# Patient Record
Sex: FEMALE | Race: WHITE | NOT HISPANIC OR LATINO
[De-identification: names, ages, dates, MRNs, and addresses within clinical notes are randomized per-mention and may not be internally consistent; named-entity substitution may affect disease eponyms.]

---

## 2017-05-30 ENCOUNTER — APPOINTMENT (OUTPATIENT)
Dept: ORTHOPEDIC SURGERY | Facility: CLINIC | Age: 67
End: 2017-05-30

## 2017-05-30 VITALS — HEART RATE: 64 BPM | DIASTOLIC BLOOD PRESSURE: 75 MMHG | SYSTOLIC BLOOD PRESSURE: 148 MMHG

## 2017-05-30 RX ORDER — UBIDECARENONE/VIT E ACET 100MG-5
CAPSULE ORAL
Refills: 0 | Status: ACTIVE | COMMUNITY

## 2017-09-20 ENCOUNTER — APPOINTMENT (OUTPATIENT)
Dept: OPHTHALMOLOGY | Facility: CLINIC | Age: 67
End: 2017-09-20
Payer: MEDICARE

## 2017-09-20 PROCEDURE — 92014 COMPRE OPH EXAM EST PT 1/>: CPT

## 2017-09-26 ENCOUNTER — APPOINTMENT (OUTPATIENT)
Dept: ORTHOPEDIC SURGERY | Facility: CLINIC | Age: 67
End: 2017-09-26
Payer: MEDICARE

## 2017-09-26 VITALS
HEIGHT: 63 IN | WEIGHT: 117 LBS | SYSTOLIC BLOOD PRESSURE: 146 MMHG | HEART RATE: 64 BPM | BODY MASS INDEX: 20.73 KG/M2 | DIASTOLIC BLOOD PRESSURE: 77 MMHG

## 2017-09-26 DIAGNOSIS — M75.82 OTHER SHOULDER LESIONS, LEFT SHOULDER: ICD-10-CM

## 2017-09-26 PROCEDURE — 99213 OFFICE O/P EST LOW 20 MIN: CPT

## 2017-11-21 ENCOUNTER — RESULT REVIEW (OUTPATIENT)
Age: 67
End: 2017-11-21

## 2018-07-16 ENCOUNTER — APPOINTMENT (OUTPATIENT)
Dept: OBGYN | Facility: CLINIC | Age: 68
End: 2018-07-16
Payer: MEDICARE

## 2018-07-16 VITALS
WEIGHT: 117 LBS | HEIGHT: 63 IN | DIASTOLIC BLOOD PRESSURE: 60 MMHG | BODY MASS INDEX: 20.73 KG/M2 | SYSTOLIC BLOOD PRESSURE: 128 MMHG

## 2018-07-16 DIAGNOSIS — Z12.11 ENCOUNTER FOR SCREENING FOR MALIGNANT NEOPLASM OF COLON: ICD-10-CM

## 2018-07-16 DIAGNOSIS — Z01.419 ENCOUNTER FOR GYNECOLOGICAL EXAMINATION (GENERAL) (ROUTINE) W/OUT ABNORMAL FINDINGS: ICD-10-CM

## 2018-07-16 DIAGNOSIS — N95.2 POSTMENOPAUSAL ATROPHIC VAGINITIS: ICD-10-CM

## 2018-07-16 PROCEDURE — G0101: CPT

## 2018-07-20 LAB — CYTOLOGY CVX/VAG DOC THIN PREP: NORMAL

## 2018-11-21 ENCOUNTER — APPOINTMENT (OUTPATIENT)
Dept: OPHTHALMOLOGY | Facility: CLINIC | Age: 68
End: 2018-11-21
Payer: MEDICARE

## 2018-11-21 DIAGNOSIS — H01.025 SQUAMOUS BLEPHARITIS LEFT UPPER EYELID: ICD-10-CM

## 2018-11-21 DIAGNOSIS — H01.02A SQUAMOUS BLEPHARITIS RIGHT EYE, UPPER AND LOWER EYELIDS: ICD-10-CM

## 2018-11-21 DIAGNOSIS — H25.13 AGE-RELATED NUCLEAR CATARACT, BILATERAL: ICD-10-CM

## 2018-11-21 DIAGNOSIS — H01.024 SQUAMOUS BLEPHARITIS LEFT UPPER EYELID: ICD-10-CM

## 2018-11-21 PROCEDURE — 92014 COMPRE OPH EXAM EST PT 1/>: CPT

## 2019-04-24 ENCOUNTER — APPOINTMENT (OUTPATIENT)
Dept: ORTHOPEDIC SURGERY | Facility: CLINIC | Age: 69
End: 2019-04-24
Payer: MEDICARE

## 2019-04-24 VITALS
DIASTOLIC BLOOD PRESSURE: 79 MMHG | HEART RATE: 69 BPM | HEIGHT: 63 IN | WEIGHT: 117 LBS | SYSTOLIC BLOOD PRESSURE: 130 MMHG | BODY MASS INDEX: 20.73 KG/M2

## 2019-04-24 DIAGNOSIS — M47.817 SPONDYLOSIS W/OUT MYELOPATHY OR RADICULOPATHY, LUMBOSACRAL REGION: ICD-10-CM

## 2019-04-24 PROCEDURE — 99214 OFFICE O/P EST MOD 30 MIN: CPT

## 2019-04-24 PROCEDURE — 72100 X-RAY EXAM L-S SPINE 2/3 VWS: CPT

## 2019-12-03 ENCOUNTER — APPOINTMENT (OUTPATIENT)
Dept: ORTHOPEDIC SURGERY | Facility: CLINIC | Age: 69
End: 2019-12-03
Payer: MEDICARE

## 2019-12-03 ENCOUNTER — FORM ENCOUNTER (OUTPATIENT)
Age: 69
End: 2019-12-03

## 2019-12-03 PROCEDURE — 99203 OFFICE O/P NEW LOW 30 MIN: CPT

## 2019-12-03 PROCEDURE — 73110 X-RAY EXAM OF WRIST: CPT | Mod: LT

## 2019-12-04 ENCOUNTER — OUTPATIENT (OUTPATIENT)
Dept: OUTPATIENT SERVICES | Facility: HOSPITAL | Age: 69
LOS: 1 days | End: 2019-12-04
Payer: MEDICARE

## 2019-12-04 ENCOUNTER — APPOINTMENT (OUTPATIENT)
Dept: MRI IMAGING | Facility: CLINIC | Age: 69
End: 2019-12-04
Payer: MEDICARE

## 2019-12-04 DIAGNOSIS — Z00.8 ENCOUNTER FOR OTHER GENERAL EXAMINATION: ICD-10-CM

## 2019-12-04 PROCEDURE — 73221 MRI JOINT UPR EXTREM W/O DYE: CPT | Mod: 26,LT

## 2019-12-04 PROCEDURE — 73221 MRI JOINT UPR EXTREM W/O DYE: CPT

## 2019-12-05 ENCOUNTER — MESSAGE (OUTPATIENT)
Age: 69
End: 2019-12-05

## 2020-08-06 ENCOUNTER — APPOINTMENT (OUTPATIENT)
Dept: OPHTHALMOLOGY | Facility: CLINIC | Age: 70
End: 2020-08-06
Payer: MEDICARE

## 2020-08-06 ENCOUNTER — NON-APPOINTMENT (OUTPATIENT)
Age: 70
End: 2020-08-06

## 2020-08-06 PROCEDURE — 92014 COMPRE OPH EXAM EST PT 1/>: CPT

## 2020-08-28 ENCOUNTER — APPOINTMENT (OUTPATIENT)
Dept: OPHTHALMOLOGY | Facility: CLINIC | Age: 70
End: 2020-08-28

## 2020-11-19 ENCOUNTER — APPOINTMENT (OUTPATIENT)
Dept: INTERNAL MEDICINE | Facility: CLINIC | Age: 70
End: 2020-11-19
Payer: MEDICARE

## 2020-11-19 ENCOUNTER — NON-APPOINTMENT (OUTPATIENT)
Age: 70
End: 2020-11-19

## 2020-11-19 VITALS
HEIGHT: 63 IN | SYSTOLIC BLOOD PRESSURE: 130 MMHG | TEMPERATURE: 98.9 F | WEIGHT: 113 LBS | OXYGEN SATURATION: 98 % | DIASTOLIC BLOOD PRESSURE: 70 MMHG | BODY MASS INDEX: 20.02 KG/M2 | HEART RATE: 74 BPM

## 2020-11-19 DIAGNOSIS — Z23 ENCOUNTER FOR IMMUNIZATION: ICD-10-CM

## 2020-11-19 DIAGNOSIS — Z87.440 PERSONAL HISTORY OF URINARY (TRACT) INFECTIONS: ICD-10-CM

## 2020-11-19 PROCEDURE — 99204 OFFICE O/P NEW MOD 45 MIN: CPT | Mod: 25

## 2020-11-19 PROCEDURE — 82272 OCCULT BLD FECES 1-3 TESTS: CPT

## 2020-11-19 PROCEDURE — 36415 COLL VENOUS BLD VENIPUNCTURE: CPT

## 2020-11-19 PROCEDURE — 90732 PPSV23 VACC 2 YRS+ SUBQ/IM: CPT

## 2020-11-19 PROCEDURE — G0009: CPT

## 2020-11-19 PROCEDURE — 93000 ELECTROCARDIOGRAM COMPLETE: CPT

## 2020-11-19 RX ORDER — LEVOTHYROXINE SODIUM 0.09 MG/1
88 TABLET ORAL
Qty: 90 | Refills: 3 | Status: ACTIVE | COMMUNITY
Start: 2020-11-19

## 2020-11-19 NOTE — PHYSICAL EXAM
[Normal] : normal gait, coordination grossly intact, no focal deficits [de-identified] : mildly cystic LUOQ w/o dominant mass [FreeTextEntry1] : guaiac neg mo mass [de-identified] : neg [de-identified] : neg [de-identified] : neg

## 2020-11-19 NOTE — HISTORY OF PRESENT ILLNESS
[de-identified] : 70 F w hypothroid but otherwise in good health comes in so we can assume her care as her MD retired. Pt has CPE in12/19. Her ROS is all neg exc treated once for UTI w/o recurrence of sympt. Exercises w/o CP sympt.

## 2020-11-19 NOTE — ASSESSMENT
[FreeTextEntry1] : Pt has hypothroidism on medication. History and PX neg. Refuses flu and pneumovax. Updated colonoscopy 1 yr ago. Mammo updated. Hx of UTI in past. EKG -? qs inV1,2 old septal MI vs RIVCD. Otherwise normal

## 2020-11-20 LAB
ALBUMIN SERPL ELPH-MCNC: 4.8 G/DL
ALP BLD-CCNC: 78 U/L
ALT SERPL-CCNC: 14 U/L
ANION GAP SERPL CALC-SCNC: 8 MMOL/L
APPEARANCE: ABNORMAL
AST SERPL-CCNC: 17 U/L
BACTERIA: NEGATIVE
BASOPHILS # BLD AUTO: 0.03 K/UL
BASOPHILS NFR BLD AUTO: 0.5 %
BILIRUB SERPL-MCNC: 0.5 MG/DL
BILIRUBIN URINE: NEGATIVE
BLOOD URINE: NEGATIVE
BUN SERPL-MCNC: 22 MG/DL
CALCIUM SERPL-MCNC: 10.5 MG/DL
CHLORIDE SERPL-SCNC: 101 MMOL/L
CHOLEST SERPL-MCNC: 234 MG/DL
CO2 SERPL-SCNC: 31 MMOL/L
COLOR: YELLOW
CREAT SERPL-MCNC: 0.83 MG/DL
EOSINOPHIL # BLD AUTO: 0.08 K/UL
EOSINOPHIL NFR BLD AUTO: 1.3 %
GLUCOSE QUALITATIVE U: NEGATIVE
GLUCOSE SERPL-MCNC: 153 MG/DL
HCT VFR BLD CALC: 39.2 %
HDLC SERPL-MCNC: 100 MG/DL
HGB BLD-MCNC: 12.7 G/DL
HYALINE CASTS: 2 /LPF
IMM GRANULOCYTES NFR BLD AUTO: 0.2 %
KETONES URINE: NEGATIVE
LDLC SERPL CALC-MCNC: 119 MG/DL
LEUKOCYTE ESTERASE URINE: NEGATIVE
LYMPHOCYTES # BLD AUTO: 1.41 K/UL
LYMPHOCYTES NFR BLD AUTO: 22.1 %
MAN DIFF?: NORMAL
MCHC RBC-ENTMCNC: 29.1 PG
MCHC RBC-ENTMCNC: 32.4 GM/DL
MCV RBC AUTO: 89.9 FL
MICROSCOPIC-UA: NORMAL
MONOCYTES # BLD AUTO: 0.36 K/UL
MONOCYTES NFR BLD AUTO: 5.7 %
NEUTROPHILS # BLD AUTO: 4.48 K/UL
NEUTROPHILS NFR BLD AUTO: 70.2 %
NITRITE URINE: NEGATIVE
NONHDLC SERPL-MCNC: 134 MG/DL
PH URINE: 7
PLATELET # BLD AUTO: 237 K/UL
POTASSIUM SERPL-SCNC: 4.7 MMOL/L
PROT SERPL-MCNC: 6.5 G/DL
PROTEIN URINE: NEGATIVE
RBC # BLD: 4.36 M/UL
RBC # FLD: 11.7 %
RED BLOOD CELLS URINE: 4 /HPF
SODIUM SERPL-SCNC: 140 MMOL/L
SPECIFIC GRAVITY URINE: 1.01
SQUAMOUS EPITHELIAL CELLS: 0 /HPF
T4 SERPL-MCNC: 7.4 UG/DL
TRIGL SERPL-MCNC: 75 MG/DL
TSH SERPL-ACNC: 0.33 UIU/ML
UROBILINOGEN URINE: NORMAL
WBC # FLD AUTO: 6.37 K/UL
WHITE BLOOD CELLS URINE: 0 /HPF

## 2020-11-23 LAB
BACTERIA UR CULT: NORMAL
ESTIMATED AVERAGE GLUCOSE: 108 MG/DL
GLUCOSE SERPL-MCNC: 99 MG/DL
HBA1C MFR BLD HPLC: 5.4 %

## 2020-12-16 RX ORDER — LEVOTHYROXINE SODIUM 88 UG/1
88 TABLET ORAL DAILY
Qty: 90 | Refills: 3 | Status: ACTIVE | COMMUNITY
Start: 2020-11-19 | End: 1900-01-01

## 2020-12-17 ENCOUNTER — TRANSCRIPTION ENCOUNTER (OUTPATIENT)
Age: 70
End: 2020-12-17

## 2020-12-23 PROBLEM — Z87.440 HISTORY OF URINARY TRACT INFECTION: Status: RESOLVED | Noted: 2020-11-19 | Resolved: 2020-12-23

## 2021-03-12 ENCOUNTER — APPOINTMENT (OUTPATIENT)
Dept: ORTHOPEDIC SURGERY | Facility: CLINIC | Age: 71
End: 2021-03-12
Payer: MEDICARE

## 2021-03-12 VITALS
DIASTOLIC BLOOD PRESSURE: 73 MMHG | HEIGHT: 63 IN | SYSTOLIC BLOOD PRESSURE: 125 MMHG | TEMPERATURE: 97.6 F | BODY MASS INDEX: 20.38 KG/M2 | WEIGHT: 115 LBS | HEART RATE: 80 BPM

## 2021-03-12 VITALS
WEIGHT: 115 LBS | HEART RATE: 80 BPM | HEIGHT: 63 IN | TEMPERATURE: 97.6 F | DIASTOLIC BLOOD PRESSURE: 73 MMHG | BODY MASS INDEX: 20.38 KG/M2 | SYSTOLIC BLOOD PRESSURE: 125 MMHG

## 2021-03-12 DIAGNOSIS — S63.501A UNSPECIFIED SPRAIN OF RIGHT WRIST, INITIAL ENCOUNTER: ICD-10-CM

## 2021-03-12 DIAGNOSIS — S63.502A UNSPECIFIED SPRAIN OF LEFT WRIST, INITIAL ENCOUNTER: ICD-10-CM

## 2021-03-12 PROCEDURE — 73110 X-RAY EXAM OF WRIST: CPT | Mod: 50

## 2021-03-12 PROCEDURE — 99214 OFFICE O/P EST MOD 30 MIN: CPT

## 2021-03-12 PROCEDURE — 73130 X-RAY EXAM OF HAND: CPT | Mod: 50

## 2021-03-12 RX ORDER — OMEGA-3/DHA/EPA/FISH OIL 300-1000MG
CAPSULE ORAL
Refills: 0 | Status: DISCONTINUED | COMMUNITY
End: 2021-03-12

## 2021-03-12 RX ORDER — CHROMIUM 200 MCG
TABLET ORAL
Refills: 0 | Status: DISCONTINUED | COMMUNITY
End: 2021-03-12

## 2021-03-12 NOTE — DISCUSSION/SUMMARY
[FreeTextEntry1] : She has findings consistent with bilateral wrist sprains, status post a fall approximately 3 months ago.  There is no evidence of a fracture or significant ligamentous injury.\par \par I had a discussion regarding today's visit, the diagnosis, and treatment recommendations / options. At this time, we discussed the options of observation versus hand therapy. She agreed to try a course of therapy. She was given a referral. She will return to the office if she is having persistent pain.  If her symptoms do not improve with time, then I will order an MRI.  However, I see no indication at this point in time.\par \par The patient has agreed to this plan of management and has expressed full understanding.  All questions were fully answered to the patient's satisfaction.\par \par I spent at least 30 minutes in total on this patient's visit. This included: Preparation for the visit, review of the medical records, review of pertinent diagnostic studies, examination and counseling of the patient on the above diagnosis, treatment plan and prognosis, orders of diagnostic tests, medication and/or appropriate procedures and documentation in the medical records of today's visit.

## 2021-03-12 NOTE — END OF VISIT
[FreeTextEntry3] : This note was written by Cindy Kc on 03/12/2021 acting solely as a scribe for Dr. Maurice Kahn.\par  \par All medical record entries made by the Scribe were at my, Dr. Maurice Kahn, direction and personally dictated by me on 03/12/2021. I have personally reviewed the chart and agree that the record accurately reflects my personal performance of the history, physical exam, assessment and plan.

## 2021-03-12 NOTE — HISTORY OF PRESENT ILLNESS
[Right] : right hand dominant [FreeTextEntry1] : She comes in today for evaluation of right wrist pain, which began 3 months ago. She fell and landed onto both her hands, initially noting pain to both wrists. She had applied a heating pad to the left wrist and later felt a crack in the wrist, after which her pain dissipated. She was more recently lifting a pan with her right hand and noted exacerbation of her right wrist pain. Currently she describes pain along the radial aspect of her right wrist that occasionally radiates into the hand. Her symptoms are exacerbated with lifting and grasping objects. She denies any numbness or tingling. She rates her pain an 8 out of 10 with movement.

## 2021-03-12 NOTE — PHYSICAL EXAM
[de-identified] : - Constitutional: This is a female in no obvious distress.  \par - Psych: Patient is alert and oriented to person, place and time.  Patient has a normal mood and affect.\par - Cardiovascular: Normal pulses throughout the upper extremities.  No significant varicosities are noted in the upper extremities. \par - Neuro: Strength and sensation are intact throughout the upper extremities.  Patient has normal coordination.\par - Respiratory:  Patient exhibits no evidence of shortness of breath or difficulty breathing.\par - Skin: No rashes, lesions, or other abnormalities are noted in the upper extremities.\par \par ---\par  \par Examination of both wrists and hands demonstrates no focal swelling.  She describes symptoms along the wrists bilaterally, which she cannot localize to a specific region.  There is no tenderness on either side at the distal radius dorsally, snuffbox or carpal bones.  There is no localized swelling or tenderness along the scapholunate ligaments or TFCC ligaments.  There is possibly mild tenderness along the left ECU tendon without instability.  There is no evidence of carpal instability.  She is neurovascularly intact distally. [de-identified] : PA, lateral and PA  views of both wrists and hands demonstrate no obvious fractures or arthritis.

## 2021-06-14 DIAGNOSIS — R73.9 HYPERGLYCEMIA, UNSPECIFIED: ICD-10-CM

## 2021-06-26 ENCOUNTER — LABORATORY RESULT (OUTPATIENT)
Age: 71
End: 2021-06-26

## 2021-07-01 ENCOUNTER — APPOINTMENT (OUTPATIENT)
Dept: INTERNAL MEDICINE | Facility: CLINIC | Age: 71
End: 2021-07-01
Payer: MEDICARE

## 2021-07-01 ENCOUNTER — NON-APPOINTMENT (OUTPATIENT)
Age: 71
End: 2021-07-01

## 2021-07-01 VITALS
HEART RATE: 71 BPM | WEIGHT: 118 LBS | SYSTOLIC BLOOD PRESSURE: 110 MMHG | BODY MASS INDEX: 20.91 KG/M2 | HEIGHT: 63 IN | TEMPERATURE: 98.2 F | OXYGEN SATURATION: 98 % | DIASTOLIC BLOOD PRESSURE: 70 MMHG

## 2021-07-01 PROCEDURE — 93000 ELECTROCARDIOGRAM COMPLETE: CPT

## 2021-07-01 PROCEDURE — 82272 OCCULT BLD FECES 1-3 TESTS: CPT

## 2021-07-01 PROCEDURE — G0439: CPT

## 2021-07-01 NOTE — ASSESSMENT
[FreeTextEntry1] : Patient appears to be in good health\par She will need a colonoscopy this year.  Otherwise she is up-to-date on vaccinations.  EKG was done and shows a right interventricular conduction delay without change and all blood tests were discussed.  Urinalysis was also obtained

## 2021-07-01 NOTE — HISTORY OF PRESENT ILLNESS
[de-identified] : Patient is a 70-year-old female comes in for complete physical examination.  She has had her bloods done prior to coming in and it was all normal with the exception of mildly high lipids for which she diets.  She is on thyroid replacement only and her thyroid function tests were normal.  Present time she remains physically active without chest pain palpitations swelling fainting or  dyspnea.  Has no GI symptoms and had a colonoscopy done virtually 5 years ago and will schedule another one this year.  She denies change of bowel blood in her stool or melena she has no  or gynecologic symptoms of no weakness numbness loss of balance or incoordination.  He has not had a flu shot but has had a Pneumovax and Covid.  She is considering Shingrix.  She is up-to-date on mammograms and gynecologic exam

## 2021-07-01 NOTE — PHYSICAL EXAM
[Normal] : normal gait, coordination grossly intact, no focal deficits [de-identified] : No significant findings [FreeTextEntry1] : Back negative no masses [de-identified] : No adenopathy [de-identified] : Thyroid is not palpable [de-identified] : No significant lesion

## 2021-07-02 LAB
APPEARANCE: CLEAR
BACTERIA: NEGATIVE
BILIRUBIN URINE: NEGATIVE
BLOOD URINE: NEGATIVE
COLOR: YELLOW
GLUCOSE QUALITATIVE U: NEGATIVE
HYALINE CASTS: 0 /LPF
KETONES URINE: NEGATIVE
LEUKOCYTE ESTERASE URINE: NEGATIVE
MICROSCOPIC-UA: NORMAL
NITRITE URINE: NEGATIVE
PH URINE: 6
PROTEIN URINE: NORMAL
RED BLOOD CELLS URINE: 5 /HPF
SPECIFIC GRAVITY URINE: 1.03
SQUAMOUS EPITHELIAL CELLS: 1 /HPF
UROBILINOGEN URINE: NORMAL
WHITE BLOOD CELLS URINE: 1 /HPF

## 2021-07-22 ENCOUNTER — APPOINTMENT (OUTPATIENT)
Dept: ORTHOPEDIC SURGERY | Facility: CLINIC | Age: 71
End: 2021-07-22
Payer: MEDICARE

## 2021-07-22 PROCEDURE — 25600 CLTX DST RDL FX/EPHYS SEP WO: CPT | Mod: LT

## 2021-07-22 PROCEDURE — 99214 OFFICE O/P EST MOD 30 MIN: CPT | Mod: 57
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
## 2021-07-24 NOTE — DATA REVIEWED
[Imaging Present] : Present [de-identified] : X-rays uploaded from the urgent center 3 views of the left wrist show a minimally displaced intra-articular left distal radius fracture with a styloid fracture as well as a small chip off the ulnar styloid whether this is new or not is difficult to assess.  There is minimal displacement.  It is neutral volar tilt.  Radial inclination and height are intact.

## 2021-07-24 NOTE — PHYSICAL EXAM
[FreeTextEntry1] : On exam patient is stands in good balance.  She has no other injuries other than her nondominant left wrist.  She is able to flex and extend with significant pain.  She has swelling in the area.  She has tenderness over the distal radius and she has no tenderness at the ulnar styloid.  She is moving her digits appropriately.  She is neurovascular intact.  She has no elbow pain or disability. [General Appearance - Well-Appearing] : Well appearing [General Appearance - Well Nourished] : well nourished [Oriented To Time, Place, And Person] : Oriented to person, place, and time [Impaired Insight] : Insight and judgment were intact [Affect] : The affect was normal. [Mood] : the mood was normal [Sclera] : the sclera and conjunctiva were normal [Neck Cervical Mass (___cm)] : no neck mass was observed [Heart Rate And Rhythm] : heart rate was normal and rhythm regular [] : No respiratory distress [Abdomen Soft] : Soft [Normal Station and Gait] : gait and station were normal [Tenderness] : tenderness [Swelling] : swelling [Skin Changes - Describe changes:] : No skin changes noted [Full UE ROM unless otherwise noted:] : Full range of motion unless otherwise noted. [Normal RUE ROM:] : Full range of motion throughout the right upper extremity. [Normal] : Sensation intact to light touch. [2+] : left 2+

## 2021-07-24 NOTE — DISCUSSION/SUMMARY
[All Questions Answered] : Patient (and family) had all questions answered to an agreeable level of satisfaction [Interested in Proceeding] : Patient (and family) expressed understanding and interest in proceeding with the plan as outlined [de-identified] : This can be closed management of the left distal radius fracture intra-articular.  I placed her into a short arm cast.  We have discussed limiting motion.  I want to hold this in place and take an x-ray again in a week.  I will most likely take weekly x-rays for 2 weeks to make sure it does not slip and then continue treating her close.  She is not interested in surgical treatment and we discussed that the goals of surgical treatment would include early mobilization and keeping the fracture in position.  Hopefully the cast will keep this in position this is stable fracture.  I will see her again in 1 week for x-ray.\par \par If imaging was ordered, the patient was told to make an appointment to review findings right after all imaging is completed.\par \par We discussed risks, benefits and alternatives. Rationale of care was reviewed and all questions were answered. Patient (and family) had all questions answered to her degree of the level of satisfaction. Patient (and family) expressed understanding and interest in proceeding with the plan as outlined.\par \par \par \par \par This note was done with a voice recognition transcription software and any typos are related to this rather than medical error. Surgical risks reviewed. Patient (and family) had all questions answered to an agreeable level of satisfaction. Patient (and family) expressed understanding and interest in proceeding with the plan as outlined.  \par

## 2021-07-28 ENCOUNTER — TRANSCRIPTION ENCOUNTER (OUTPATIENT)
Age: 71
End: 2021-07-28

## 2021-07-29 ENCOUNTER — APPOINTMENT (OUTPATIENT)
Dept: ORTHOPEDIC SURGERY | Facility: CLINIC | Age: 71
End: 2021-07-29
Payer: MEDICARE

## 2021-07-29 VITALS
WEIGHT: 116 LBS | HEART RATE: 65 BPM | OXYGEN SATURATION: 98 % | DIASTOLIC BLOOD PRESSURE: 76 MMHG | HEIGHT: 63 IN | SYSTOLIC BLOOD PRESSURE: 144 MMHG | BODY MASS INDEX: 20.55 KG/M2

## 2021-07-29 PROCEDURE — 99024 POSTOP FOLLOW-UP VISIT: CPT

## 2021-07-29 PROCEDURE — 73110 X-RAY EXAM OF WRIST: CPT | Mod: LT

## 2021-07-29 NOTE — PHYSICAL EXAM
[FreeTextEntry1] : On exam the cast edges are good.  She still has good shoulder and elbow range of motion.  She has pain with pronation and supination.  She has pain more with thumb motion but her digital motion and thumb motion is intact.  She has minimal swelling.  She is neurovascularly intact. [General Appearance - Well-Appearing] : Well appearing [General Appearance - Well Nourished] : well nourished [Normal Station and Gait] : gait and station were normal [Tenderness] : tenderness [Swelling] : swelling [Skin Changes - Describe changes:] : No skin changes noted [Full UE ROM unless otherwise noted:] : Full range of motion unless otherwise noted. [Normal RUE ROM:] : Full range of motion throughout the right upper extremity. [Normal] : Sensation intact to light touch. [2+] : left 2+

## 2021-07-29 NOTE — DATA REVIEWED
[Imaging Present] : Present [de-identified] : X-rays today 3 views of the left wrist show the fracture in good position.  It is still with neutral tilt.  The intra-articular portion has not displaced at all.  Overall this is in the same alignment and is stable.
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
## 2021-07-29 NOTE — DISCUSSION/SUMMARY
[All Questions Answered] : Patient (and family) had all questions answered to an agreeable level of satisfaction [Interested in Proceeding] : Patient (and family) expressed understanding and interest in proceeding with the plan as outlined [de-identified] : Patient's fracture is stable 1 week after casting.  She has not displaced.  I would take 1 more x-ray in 1 week to make sure this does not displace and then she will have a total of 6 weeks of casting if she does not displace.  I have recommended finger range of motion exercises to keep them supple as well as coming out of the sling so as to not get shoulder and elbow stiff.  Follow-up 1 week for x-ray.\par \par If imaging was ordered, the patient was told to make an appointment to review findings right after all imaging is completed.\par \par We discussed risks, benefits and alternatives. Rationale of care was reviewed and all questions were answered. Patient (and family) had all questions answered to her degree of the level of satisfaction. Patient (and family) expressed understanding and interest in proceeding with the plan as outlined.\par \par \par \par \par This note was done with a voice recognition transcription software and any typos are related to this rather than medical error. Surgical risks reviewed. Patient (and family) had all questions answered to an agreeable level of satisfaction. Patient (and family) expressed understanding and interest in proceeding with the plan as outlined.  \par

## 2021-08-05 ENCOUNTER — APPOINTMENT (OUTPATIENT)
Dept: ORTHOPEDIC SURGERY | Facility: CLINIC | Age: 71
End: 2021-08-05
Payer: MEDICARE

## 2021-08-05 VITALS
SYSTOLIC BLOOD PRESSURE: 125 MMHG | BODY MASS INDEX: 20.55 KG/M2 | DIASTOLIC BLOOD PRESSURE: 79 MMHG | HEART RATE: 70 BPM | WEIGHT: 116 LBS | OXYGEN SATURATION: 98 % | HEIGHT: 63 IN

## 2021-08-05 PROCEDURE — 73110 X-RAY EXAM OF WRIST: CPT | Mod: LT

## 2021-08-05 PROCEDURE — 99024 POSTOP FOLLOW-UP VISIT: CPT

## 2021-08-05 NOTE — HISTORY OF PRESENT ILLNESS
[FreeTextEntry1] : Patient started doing some exercises including typing and moving around.  She has some pain when she is doing any strength.  She is otherwise able to move better than before. [Improving] : improving [___ wks] : [unfilled] week(s) ago [2] : currently ~his/her~ pain is 2 out of 10 [Intermit.] : ~He/She~ states the symptoms seem to be intermittent [Joint Movement] : worsened by joint movement [Lifting] : worsened by lifting

## 2021-08-05 NOTE — DATA REVIEWED
[Imaging Present] : Present [de-identified] : X-rays today 3 views the left wrist show the distal radius intra-articular fracture with minimal displacement.  Overall this looks similar to before with no displacement.  It is in similar position to before.  This is in acceptable position.

## 2021-08-05 NOTE — DISCUSSION/SUMMARY
[All Questions Answered] : Patient (and family) had all questions answered to an agreeable level of satisfaction [Interested in Proceeding] : Patient (and family) expressed understanding and interest in proceeding with the plan as outlined [de-identified] : Patient has been stable for the past 2 weeks.  My recommendation is to leave her in a cast for total 6 weeks.  I will see her again in 4weeks with cast off and x-ray with plan for splint and therapy.\par \par If imaging was ordered, the patient was told to make an appointment to review findings right after all imaging is completed.\par \par We discussed risks, benefits and alternatives. Rationale of care was reviewed and all questions were answered. Patient (and family) had all questions answered to her degree of the level of satisfaction. Patient (and family) expressed understanding and interest in proceeding with the plan as outlined.\par \par \par \par \par This note was done with a voice recognition transcription software and any typos are related to this rather than medical error. Surgical risks reviewed. Patient (and family) had all questions answered to an agreeable level of satisfaction. Patient (and family) expressed understanding and interest in proceeding with the plan as outlined.  \par

## 2021-08-05 NOTE — PHYSICAL EXAM
[FreeTextEntry1] : On exam the patient stands in good balance.  She is able to move appropriately.  She is in the cast so does not move her wrist but her fingers and thumb are moving appropriately.  She is neurovascularly intact. [General Appearance - Well-Appearing] : Well appearing [General Appearance - Well Nourished] : well nourished [Normal Station and Gait] : gait and station were normal [Tenderness] : the appearance ~Z of the left arm ~Z was normal [Swelling] : no swelling [Skin Changes - Describe changes:] : No skin changes noted [Full UE ROM unless otherwise noted:] : Full range of motion unless otherwise noted. [Normal RUE ROM:] : Full range of motion throughout the right upper extremity. [Normal] : Sensation intact to light touch. [2+] : left 2+

## 2021-09-02 ENCOUNTER — APPOINTMENT (OUTPATIENT)
Dept: ORTHOPEDIC SURGERY | Facility: CLINIC | Age: 71
End: 2021-09-02
Payer: MEDICARE

## 2021-09-02 PROCEDURE — 73110 X-RAY EXAM OF WRIST: CPT | Mod: LT

## 2021-09-02 PROCEDURE — 99024 POSTOP FOLLOW-UP VISIT: CPT

## 2021-09-10 NOTE — DATA REVIEWED
[Imaging Present] : Present [de-identified] : X-rays today show the minimally displaced distal radius fracture in the same position as before.  Has not displaced further.  Has not fallen.  It is approximately neutral with good healing seen.

## 2021-09-10 NOTE — PHYSICAL EXAM
[FreeTextEntry1] : On exam patient has mild tenderness to palpation over the distal radius.  She also has some stiffness in the fingers but no severe stenosis with pronation supination at the wrist.  She has no pain at that point not distal end.  She has no proximal forearm or elbow pain.  40 degrees flexion 60 degrees plantarflexion.  She has good digital motion.  She is neurovascularly intact [General Appearance - Well-Appearing] : Well appearing [General Appearance - Well Nourished] : well nourished [Normal Station and Gait] : gait and station were normal [Tenderness] : tenderness [Swelling] : no swelling [Skin Changes - Describe changes:] : No skin changes noted [Full UE ROM unless otherwise noted:] : Full range of motion unless otherwise noted. [Normal RUE ROM:] : Full range of motion throughout the right upper extremity. [Normal] : Sensation intact to light touch. [2+] : left 2+
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
## 2021-09-10 NOTE — DISCUSSION/SUMMARY
[All Questions Answered] : Patient (and family) had all questions answered to an agreeable level of satisfaction [Interested in Proceeding] : Patient (and family) expressed understanding and interest in proceeding with the plan as outlined [de-identified] : Patient is 6 weeks post wrist fracture.  I will place her into a removable splint to move encouraged her to do range of motion.  Have also written her to do some physical therapy.  I can see her back in 6 weeks or as needed.  Pain and strength improved she can go back to activity.  Follow-up for radiographic evaluation in 6 weeks.\par \par If imaging was ordered, the patient was told to make an appointment to review findings right after all imaging is completed.\par \par We discussed risks, benefits and alternatives. Rationale of care was reviewed and all questions were answered. Patient (and family) had all questions answered to her degree of the level of satisfaction. Patient (and family) expressed understanding and interest in proceeding with the plan as outlined.\par \par \par \par \par This note was done with a voice recognition transcription software and any typos are related to this rather than medical error. Surgical risks reviewed. Patient (and family) had all questions answered to an agreeable level of satisfaction. Patient (and family) expressed understanding and interest in proceeding with the plan as outlined.  \par

## 2021-10-18 ENCOUNTER — APPOINTMENT (OUTPATIENT)
Dept: ORTHOPEDIC SURGERY | Facility: CLINIC | Age: 71
End: 2021-10-18
Payer: MEDICARE

## 2021-10-18 PROCEDURE — 99024 POSTOP FOLLOW-UP VISIT: CPT

## 2021-10-18 PROCEDURE — 73110 X-RAY EXAM OF WRIST: CPT | Mod: LT

## 2021-10-18 NOTE — PHYSICAL EXAM
[FreeTextEntry1] : On exam patient stands in good balance.  She has better motion than before.  She has approximately 70 degrees of extension and 55 degrees of flexion.  She can be pushed more but it is painful and somewhat stiff.  She has no tenderness at this point.  With full motion she does have some pain and with strength activities she has some pain in the ulnar gutter other than that she is intact.  She is moving her digits appropriately and has normal sensation. [General Appearance - Well-Appearing] : Well appearing [General Appearance - Well Nourished] : well nourished [Tenderness] : the appearance ~Z of the left arm ~Z was normal [Swelling] : no swelling [Skin Changes - Describe changes:] : No skin changes noted [Full UE ROM unless otherwise noted:] : Full range of motion unless otherwise noted. [Normal RUE ROM:] : Full range of motion throughout the right upper extremity. [Normal] : Sensation intact to light touch. [2+] : left 2+
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
## 2021-10-18 NOTE — DISCUSSION/SUMMARY
[All Questions Answered] : Patient (and family) had all questions answered to an agreeable level of satisfaction [Interested in Proceeding] : Patient (and family) expressed understanding and interest in proceeding with the plan as outlined [de-identified] : Patient is doing well 3 months after her wrist fracture.  She is free to go back to regular activity.  She only uses her brace when she is lifting heavy objects or when she is typing for a long time.  I told her to use this as she needs it.  I can see her back again as needed but she is free to do all activities.\par \par If imaging was ordered, the patient was told to make an appointment to review findings right after all imaging is completed.\par \par We discussed risks, benefits and alternatives. Rationale of care was reviewed and all questions were answered. Patient (and family) had all questions answered to her degree of the level of satisfaction. Patient (and family) expressed understanding and interest in proceeding with the plan as outlined.\par \par \par \par \par This note was done with a voice recognition transcription software and any typos are related to this rather than medical error. Surgical risks reviewed. Patient (and family) had all questions answered to an agreeable level of satisfaction. Patient (and family) expressed understanding and interest in proceeding with the plan as outlined.  \par

## 2021-10-18 NOTE — DATA REVIEWED
[Imaging Present] : Present [de-identified] : X-rays today multiple views of the left wrist show the healed intra-articular fracture.  There is no displacement.  Everything is appropriate position.  There is a small naomi off of the ulnar styloid.

## 2021-12-01 ENCOUNTER — APPOINTMENT (OUTPATIENT)
Dept: OPHTHALMOLOGY | Facility: CLINIC | Age: 71
End: 2021-12-01
Payer: MEDICARE

## 2021-12-01 ENCOUNTER — NON-APPOINTMENT (OUTPATIENT)
Age: 71
End: 2021-12-01

## 2021-12-01 PROCEDURE — 92012 INTRM OPH EXAM EST PATIENT: CPT

## 2021-12-01 PROCEDURE — 92020 GONIOSCOPY: CPT

## 2021-12-21 ENCOUNTER — APPOINTMENT (OUTPATIENT)
Dept: OPHTHALMOLOGY | Facility: CLINIC | Age: 71
End: 2021-12-21
Payer: MEDICARE

## 2021-12-21 ENCOUNTER — NON-APPOINTMENT (OUTPATIENT)
Age: 71
End: 2021-12-21

## 2021-12-21 PROCEDURE — 92012 INTRM OPH EXAM EST PATIENT: CPT

## 2021-12-22 ENCOUNTER — APPOINTMENT (OUTPATIENT)
Dept: OPHTHALMOLOGY | Facility: CLINIC | Age: 71
End: 2021-12-22
Payer: MEDICARE

## 2021-12-22 ENCOUNTER — NON-APPOINTMENT (OUTPATIENT)
Age: 71
End: 2021-12-22

## 2021-12-22 PROCEDURE — 66761 REVISION OF IRIS: CPT | Mod: RT

## 2022-01-05 ENCOUNTER — APPOINTMENT (OUTPATIENT)
Dept: OPHTHALMOLOGY | Facility: CLINIC | Age: 72
End: 2022-01-05
Payer: MEDICARE

## 2022-01-05 ENCOUNTER — NON-APPOINTMENT (OUTPATIENT)
Age: 72
End: 2022-01-05

## 2022-01-05 PROCEDURE — 66761 REVISION OF IRIS: CPT | Mod: LT

## 2022-01-06 DIAGNOSIS — S52.572A OTHER INTRAARTICULAR FRACTURE OF LOWER END OF LEFT RADIUS, INITIAL ENCOUNTER FOR CLOSED FRACTURE: ICD-10-CM

## 2022-01-19 ENCOUNTER — APPOINTMENT (OUTPATIENT)
Dept: OPHTHALMOLOGY | Facility: CLINIC | Age: 72
End: 2022-01-19
Payer: MEDICARE

## 2022-01-19 ENCOUNTER — NON-APPOINTMENT (OUTPATIENT)
Age: 72
End: 2022-01-19

## 2022-01-19 PROCEDURE — 99024 POSTOP FOLLOW-UP VISIT: CPT

## 2022-01-26 ENCOUNTER — APPOINTMENT (OUTPATIENT)
Dept: INTERNAL MEDICINE | Facility: CLINIC | Age: 72
End: 2022-01-26
Payer: MEDICARE

## 2022-01-26 VITALS
HEIGHT: 63 IN | WEIGHT: 118 LBS | OXYGEN SATURATION: 99 % | HEART RATE: 88 BPM | BODY MASS INDEX: 20.91 KG/M2 | TEMPERATURE: 98.2 F

## 2022-01-26 VITALS — DIASTOLIC BLOOD PRESSURE: 70 MMHG | SYSTOLIC BLOOD PRESSURE: 100 MMHG

## 2022-01-26 DIAGNOSIS — R74.8 ABNORMAL LEVELS OF OTHER SERUM ENZYMES: ICD-10-CM

## 2022-01-26 PROCEDURE — 99214 OFFICE O/P EST MOD 30 MIN: CPT | Mod: 25

## 2022-01-26 PROCEDURE — 36415 COLL VENOUS BLD VENIPUNCTURE: CPT

## 2022-01-26 NOTE — ASSESSMENT
[FreeTextEntry1] : Rechecking lipase/ylase to further w/u -clinicall. No evid og hyperthytoid - recheck TFT. No need fir aorta eval at this time -see kyleigh silverman dissection

## 2022-01-26 NOTE — HISTORY OF PRESENT ILLNESS
[de-identified] : 71 F in good health w exception of hypothyroid on levothyr w previous thyroid storm. Had blood test ind THS was low and amylase slightly high and comes in to determine need for change in thyroid dose. She denies wt loss abd pain change in bowel and will have repeat amylase. Lastly her bros who was a drug addict had sudden death and autopsy showsed dissecting AA - he was hypertensive. NO other family hx of AAA, hx of collagen d and pt is non smoker.

## 2022-01-26 NOTE — PHYSICAL EXAM
[No Acute Distress] : no acute distress [Well Nourished] : well nourished [Well Developed] : well developed [Well-Appearing] : well-appearing [Normal Sclera/Conjunctiva] : normal sclera/conjunctiva [PERRL] : pupils equal round and reactive to light [EOMI] : extraocular movements intact [Normal Outer Ear/Nose] : the outer ears and nose were normal in appearance [Normal Oropharynx] : the oropharynx was normal [No JVD] : no jugular venous distention [No Lymphadenopathy] : no lymphadenopathy [Supple] : supple [Thyroid Normal, No Nodules] : the thyroid was normal and there were no nodules present [No Respiratory Distress] : no respiratory distress  [No Accessory Muscle Use] : no accessory muscle use [Clear to Auscultation] : lungs were clear to auscultation bilaterally [Normal Rate] : normal rate  [Regular Rhythm] : with a regular rhythm [Normal S1, S2] : normal S1 and S2 [No Murmur] : no murmur heard [No Carotid Bruits] : no carotid bruits [No Abdominal Bruit] : a ~M bruit was not heard ~T in the abdomen [No Varicosities] : no varicosities [Pedal Pulses Present] : the pedal pulses are present [No Edema] : there was no peripheral edema [No Palpable Aorta] : no palpable aorta [No Extremity Clubbing/Cyanosis] : no extremity clubbing/cyanosis [Soft] : abdomen soft [Non Tender] : non-tender [Non-distended] : non-distended [No Masses] : no abdominal mass palpated [No HSM] : no HSM [Normal Bowel Sounds] : normal bowel sounds [Normal Posterior Cervical Nodes] : no posterior cervical lymphadenopathy [Normal Anterior Cervical Nodes] : no anterior cervical lymphadenopathy [Normal] : no posterior cervical lymphadenopathy and no anterior cervical lymphadenopathy [No CVA Tenderness] : no CVA  tenderness [No Spinal Tenderness] : no spinal tenderness [No Joint Swelling] : no joint swelling [Grossly Normal Strength/Tone] : grossly normal strength/tone [No Rash] : no rash [Coordination Grossly Intact] : coordination grossly intact [No Focal Deficits] : no focal deficits [Normal Gait] : normal gait [Deep Tendon Reflexes (DTR)] : deep tendon reflexes were 2+ and symmetric [Normal Affect] : the affect was normal [Normal Insight/Judgement] : insight and judgment were intact [de-identified] : 2/6 ejection m

## 2022-01-27 LAB
LPL SERPL-CCNC: 36 U/L
T3 SERPL-MCNC: 83 NG/DL
T4 SERPL-MCNC: 7.2 UG/DL
TSH SERPL-ACNC: 0.44 UIU/ML

## 2022-02-01 LAB
AMYLASE P SERPL-CCNC: 32 U/L
AMYLASE S SERPL-CCNC: 87 U/L
AMYLASE SERPL-CCNC: 119 U/L

## 2022-02-23 ENCOUNTER — APPOINTMENT (OUTPATIENT)
Dept: OPHTHALMOLOGY | Facility: CLINIC | Age: 72
End: 2022-02-23
Payer: MEDICARE

## 2022-02-23 ENCOUNTER — NON-APPOINTMENT (OUTPATIENT)
Age: 72
End: 2022-02-23

## 2022-02-23 PROCEDURE — 92014 COMPRE OPH EXAM EST PT 1/>: CPT

## 2022-03-30 ENCOUNTER — APPOINTMENT (OUTPATIENT)
Dept: OPHTHALMOLOGY | Facility: CLINIC | Age: 72
End: 2022-03-30

## 2022-05-28 ENCOUNTER — EMERGENCY (EMERGENCY)
Facility: HOSPITAL | Age: 72
LOS: 1 days | Discharge: HOME | End: 2022-05-28
Attending: EMERGENCY MEDICINE
Payer: MEDICARE

## 2022-05-28 VITALS
SYSTOLIC BLOOD PRESSURE: 132 MMHG | RESPIRATION RATE: 18 BRPM | OXYGEN SATURATION: 98 % | TEMPERATURE: 98 F | HEART RATE: 55 BPM | DIASTOLIC BLOOD PRESSURE: 78 MMHG

## 2022-05-28 VITALS
HEIGHT: 63 IN | WEIGHT: 119.05 LBS | SYSTOLIC BLOOD PRESSURE: 148 MMHG | HEART RATE: 73 BPM | OXYGEN SATURATION: 99 % | RESPIRATION RATE: 16 BRPM | TEMPERATURE: 98 F | DIASTOLIC BLOOD PRESSURE: 69 MMHG

## 2022-05-28 PROCEDURE — 99283 EMERGENCY DEPT VISIT LOW MDM: CPT | Mod: 25

## 2022-05-28 PROCEDURE — G0168: CPT

## 2022-05-28 PROCEDURE — 12001 RPR S/N/AX/GEN/TRNK 2.5CM/<: CPT

## 2022-05-28 PROCEDURE — 73130 X-RAY EXAM OF HAND: CPT | Mod: 26,LT

## 2022-05-28 PROCEDURE — 99283 EMERGENCY DEPT VISIT LOW MDM: CPT | Mod: FS,25

## 2022-05-28 PROCEDURE — 73130 X-RAY EXAM OF HAND: CPT

## 2022-05-28 RX ORDER — TETANUS TOXOID, REDUCED DIPHTHERIA TOXOID AND ACELLULAR PERTUSSIS VACCINE, ADSORBED 5; 2.5; 8; 8; 2.5 [IU]/.5ML; [IU]/.5ML; UG/.5ML; UG/.5ML; UG/.5ML
0.5 SUSPENSION INTRAMUSCULAR ONCE
Refills: 0 | Status: DISCONTINUED | OUTPATIENT
Start: 2022-05-28 | End: 2022-05-28

## 2022-05-28 NOTE — ED PROVIDER NOTE - NSFOLLOWUPINSTRUCTIONS_ED_ALL_ED_FT
YOU WERE SEEN IN THE ED FOR: laceration between your 3rd and 4th fingers of the left hand (middle and ring fingers)    WHILE YOU WERE HERE, YOU HAD: dermabond placed over laceration as the area was well approximated     FOR PAIN, YOU MAY TAKE TYLENOL (Acetaminophen) AND/OR IBUPROFEN (Advil or Motrin). FOLLOW THE INSTRUCTIONS ON THE LABEL/CONTAINER.    PLEASE FOLLOW UP WITH YOUR PRIVATE PHYSICIAN WITHIN THE NEXT 24-72 HOURS. BRING COPIES OF YOUR RESULTS.    RETURN TO THE EMERGENCY DEPARTMENT IF YOU EXPERIENCE ANY NEW/CONCERNING/WORSENING SYMPTOMS SUCH AS BUT NOT LIMITED TO: redness around wound site or extending from wound site, decreased range of motion of fingers, pain with moving fingers, bleeding or purulence or any other concerns.

## 2022-05-28 NOTE — ED PROCEDURE NOTE - PROCEDURE ADDITIONAL DETAILS
subcentimeter laceration, well approximated, washed out well with soap and water, placed dermabond on laceration

## 2022-05-28 NOTE — ED PROVIDER NOTE - CLINICAL SUMMARY MEDICAL DECISION MAKING FREE TEXT BOX
Venkata Umana (PA): 71y F w/ no PMHx presents w/ simple lac to L 3/4 web space w/o visible FB from kitchen knife. Pt is UTD w/ TDAP. NVI on exam. Plan for XR r/o FB, local wound care, likely closure w/ Dermabond and d/c.

## 2022-05-28 NOTE — ED ADULT NURSE REASSESSMENT NOTE - NS ED NURSE REASSESS COMMENT FT1
received report from DAGMAR Escobar. Pt awake and alert, ambulating independently, speaking in full coherent sentences. Pt to be discharged at this time.

## 2022-05-28 NOTE — ED PROVIDER NOTE - OBJECTIVE STATEMENT
71y F w/ no pertinent PMHx presents to the ED c/o lac to L hand w/ knife. Pt was cutting a tomato when the knife slipped and cut in between the L 3rd and 4th digits. Unsure if TDAP is UTD. 71y F w/ no pertinent PMHx presents to the ED c/o lac to L hand w/ knife. Pt was cutting a tomato when the knife slipped and cut in between the L 3rd and 4th digits. Pt is UTD w/ TDAP (Sep 2014).

## 2022-05-28 NOTE — ED PROVIDER NOTE - ATTENDING APP SHARED VISIT CONTRIBUTION OF CARE
Attending MD Bennett:   I personally have seen and examined this patient.  Physician assistant note reviewed and agree on plan of care and except where noted.  See below for details.     Seen in [place] 34L    71F with no reported contributory PMH/PSH/Meds presents to the ED with cut to L hand in the interdigit space between the ring and middle finger of the L hand.  R hand dominant.  Reports was cutting a tomato when knife slipped and cut in between the 3rd and 4th digits.  Reports TDAP UTD.  Denies loss of sensory or limitation of motor function.  Denies other physical complaints.    Exam:   General: NAD  HENT: head NCAT, airway patent   Chest: symmetric chest rise, no increased work of breathing  MSK: ranging neck freely, FROM at L hand including at 3rd and 4th digits at MCP, PIP, DIP, cap refill <2s and +2 radials  Neuro: moving all extremities spontaneously, sensory grossly intact including in the radial, median and ulnar n distribution, no gross neuro deficits  Psych: normal mood and affect   Skin: subcentimeter laceration to the web space between 3rd and 4th digit, no active bleeding, well approximated, examined to base in bloodless field, no foreign body noted    A/P: 71F with L interdigit lac between middle and ring fingers, skin already well approximated, hand washed thoroughly in the Emergency Department by patient, will place dermabond.  Stable for discharge. Follow up instructions given, importance of follow up emphasized, return to ED parameters reviewed and patient verbalized understanding.  All questions answered, all concerns addressed.     A/P: 71F with L hand laceration  Skin: well appro    A/P: Attending MD Bennett:   I personally have seen and examined this patient.  Physician assistant note reviewed and agree on plan of care and except where noted.  See below for details.     Seen in Blue 34L    71F with no reported contributory PMH/PSH/Meds presents to the ED with cut to L hand in the interdigit space between the ring and middle finger of the L hand.  R hand dominant.  Reports was cutting a tomato when knife slipped and cut in between the 3rd and 4th digits.  Reports TDAP UTD.  Denies loss of sensory or limitation of motor function.  Denies other physical complaints.    Exam:   General: NAD  HENT: head NCAT, airway patent   Chest: symmetric chest rise, no increased work of breathing  MSK: ranging neck freely, FROM at L hand including at 3rd and 4th digits at MCP, PIP, DIP, cap refill <2s and +2 radials  Neuro: moving all extremities spontaneously, sensory grossly intact including in the radial, median and ulnar n distribution, no gross neuro deficits  Psych: normal mood and affect   Skin: subcentimeter laceration to the web space between 3rd and 4th digit, no active bleeding, well approximated, examined to base in bloodless field, no foreign body noted    A/P: 71F with L interdigit lac between middle and ring fingers, skin already well approximated, hand washed thoroughly in the Emergency Department by patient, will place dermabond.  Stable for discharge. Follow up instructions given, importance of follow up emphasized, return to ED parameters reviewed and patient verbalized understanding.  All questions answered, all concerns addressed.

## 2022-05-28 NOTE — ED PROVIDER NOTE - PATIENT PORTAL LINK FT
You can access the FollowMyHealth Patient Portal offered by [facility] by registering at the following website: [email or web address]. By joining [employer]’s FollowMyHealth portal, you will also be able to view your health information using other applications (apps) compatible with our system.

## 2022-05-28 NOTE — ED PROVIDER NOTE - PHYSICAL EXAMINATION
GEN: Pt in NAD, non-toxic.  PSYCH: Affect appropriate.  EYES: Sclera white w/o injection.   ENT: Head NCAT. Neck supple FROM.   RESP: No chest wall tenderness, CTA b/l, no wheezes, rales, or rhonchi. No evidence of respiratory distress  CARDIAC: RRR, clear distinct S1, S2, no appreciable murmurs.  ABD: Abdomen soft, non-tender. No CVAT b/l.  VASC: 2+ dorsalis pedis pulses b/l. No edema or calf tenderness.  MSK: No joint erythema or obvious deformity. No obvious spinal deformity, no midline spinal ttp, no step-offs. +ttp of paraspinal musculature in the right lumbar region. FROM b/l LE.    NEURO: Normal and equal sensation and 5/5 strength of LE and UE b/l. Steady gait. Negative Straight leg raise and cross leg raise b/l. No clonus, downward babinski. FROM w/o pain of B/L UE, 2+ radial pulses, cap refill <2s.  SKIN: Subcentimeter linear lac in L 3rd to 4th web space of the hand, no physical FB, no surrounding erythema, no d/c, no active bleeding, no other injury. GEN: Pt in NAD.  PSYCH: Affect appropriate.  EYES: Sclera white w/o injection.   ENT: Head NCAT.   RESP: No evidence of respiratory distress  CARDIAC: RRR  VASC: 2+ radial pulses b/l. Cap refill <2 sec.  MSK: No joint erythema or obvious deformity. FROM b/l UE.  NEURO: Normal and equal sensation and 5/5 strength of LE and UE b/l.  SKIN: Subcentimeter linear laceration in 3rd/4th web space of the left hand, no visible FB, no surrounding erythema, no DC, no active bleeding, no other noticeable injury.

## 2022-08-04 PROBLEM — Z78.9 OTHER SPECIFIED HEALTH STATUS: Chronic | Status: ACTIVE | Noted: 2022-05-28

## 2022-08-15 ENCOUNTER — APPOINTMENT (OUTPATIENT)
Dept: ORTHOPEDIC SURGERY | Facility: CLINIC | Age: 72
End: 2022-08-15

## 2022-08-15 DIAGNOSIS — M25.532 PAIN IN LEFT WRIST: ICD-10-CM

## 2022-08-15 PROCEDURE — 99213 OFFICE O/P EST LOW 20 MIN: CPT

## 2022-08-15 PROCEDURE — 73110 X-RAY EXAM OF WRIST: CPT | Mod: LT

## 2022-08-16 PROBLEM — M25.532 LEFT WRIST PAIN: Status: ACTIVE | Noted: 2019-12-03

## 2022-08-16 NOTE — DATA REVIEWED
[Imaging Present] : Present [de-identified] : X-rays today multiple views of the left wrist show the fracture healed.  There is some residual dorsal tilt.  There are no other obvious findings.

## 2022-08-16 NOTE — PHYSICAL EXAM
[FreeTextEntry1] : On exam patient stands in good balance.  She is able to move around appropriately with full range of motion.  She does have some tenderness to palpation over the distal radiocarpal joint.  This also goes somewhat radially.  She has minimal Finkelstein test.  She is able to move everything.  She has no snuffbox tenderness.  Neurovascular intact with 80 degrees of extension and 70 degrees of flexion. [General Appearance - Well-Appearing] : Well appearing [General Appearance - Well Nourished] : well nourished [Oriented To Time, Place, And Person] : Oriented to person, place, and time [Impaired Insight] : Insight and judgment were intact [Affect] : The affect was normal. [Mood] : the mood was normal [Sclera] : the sclera and conjunctiva were normal [Neck Cervical Mass (___cm)] : no neck mass was observed [Heart Rate And Rhythm] : heart rate was normal and rhythm regular [] : No respiratory distress [Abdomen Soft] : Soft [Normal Station and Gait] : gait and station were normal [Tenderness] : tenderness [Swelling] : no swelling [Skin Changes - Describe changes:] : No skin changes noted [Full UE ROM unless otherwise noted:] : Full range of motion unless otherwise noted. [Normal RUE ROM:] : Full range of motion throughout the right upper extremity. [Normal] : Sensation intact to light touch. [2+] : left 2+

## 2022-08-16 NOTE — DISCUSSION/SUMMARY
[All Questions Answered] : Patient (and family) had all questions answered to an agreeable level of satisfaction [Interested in Proceeding] : Patient (and family) expressed understanding and interest in proceeding with the plan as outlined [de-identified] : I think she may have a little bit of posttraumatic brightest versus tendinitis.  In the event I suggested starting with NSAIDs either topical or oral.  She has done a significant amount of therapy.  If she does not get better then I would consider doing MRI scan of her wrist and having her see one of the hand and wrist surgeons.\par \par If imaging was ordered, the patient was told to make an appointment to review findings right after all imaging is completed.\par \par We discussed risks, benefits and alternatives. Rationale of care was reviewed and all questions were answered. Patient (and family) had all questions answered to her degree of the level of satisfaction. Patient (and family) expressed understanding and interest in proceeding with the plan as outlined.\par \par \par \par \par This note was done with a voice recognition transcription software and any typos are related to this rather than medical error. Surgical risks reviewed. Patient (and family) had all questions answered to an agreeable level of satisfaction. Patient (and family) expressed understanding and interest in proceeding with the plan as outlined.  \par

## 2022-08-16 NOTE — HISTORY OF PRESENT ILLNESS
[FreeTextEntry1] : Patient still having left wrist pain mostly radially over the dorsal radiocarpal joint.  It bothers her when she is doing some strength issues as well as when she is trying to twist things.  She has done therapy COVID few months however has not had much improvement.  It has not caused her to take any medication. [Stable] : stable [2] : currently ~his/her~ pain is 2 out of 10 [Intermit.] : ~He/She~ states the symptoms seem to be intermittent [Direct Pressure] : worsened by direct pressure [Joint Movement] : worsened by joint movement [de-identified] : Strength

## 2022-09-23 DIAGNOSIS — Z00.00 ENCOUNTER FOR GENERAL ADULT MEDICAL EXAMINATION W/OUT ABNORMAL FINDINGS: ICD-10-CM

## 2022-10-18 DIAGNOSIS — Z20.822 CONTACT WITH AND (SUSPECTED) EXPOSURE TO COVID-19: ICD-10-CM

## 2022-12-05 ENCOUNTER — RX RENEWAL (OUTPATIENT)
Age: 72
End: 2022-12-05

## 2022-12-08 RX ORDER — LEVOTHYROXINE SODIUM 88 UG/1
88 TABLET ORAL
Qty: 90 | Refills: 3 | Status: ACTIVE | COMMUNITY
Start: 2020-12-16 | End: 1900-01-01

## 2022-12-09 ENCOUNTER — LABORATORY RESULT (OUTPATIENT)
Age: 72
End: 2022-12-09

## 2022-12-12 LAB
COVID-19 NUCLEOCAPSID  GAM ANTIBODY INTERPRETATION: NEGATIVE
COVID-19 SPIKE DOMAIN ANTIBODY INTERPRETATION: POSITIVE
SARS-COV-2 AB SERPL IA-ACNC: >250 U/ML
SARS-COV-2 AB SERPL QL IA: 0.08 INDEX

## 2022-12-15 ENCOUNTER — NON-APPOINTMENT (OUTPATIENT)
Age: 72
End: 2022-12-15

## 2022-12-15 ENCOUNTER — APPOINTMENT (OUTPATIENT)
Dept: INTERNAL MEDICINE | Facility: CLINIC | Age: 72
End: 2022-12-15

## 2022-12-15 VITALS
BODY MASS INDEX: 20.91 KG/M2 | HEART RATE: 79 BPM | HEIGHT: 63 IN | TEMPERATURE: 98.2 F | OXYGEN SATURATION: 98 % | WEIGHT: 118 LBS

## 2022-12-15 DIAGNOSIS — M81.0 AGE-RELATED OSTEOPOROSIS W/OUT CURRENT PATHOLOGICAL FRACTURE: ICD-10-CM

## 2022-12-15 DIAGNOSIS — E03.9 HYPOTHYROIDISM, UNSPECIFIED: ICD-10-CM

## 2022-12-15 PROCEDURE — 93000 ELECTROCARDIOGRAM COMPLETE: CPT | Mod: 59

## 2022-12-15 PROCEDURE — G0439: CPT

## 2022-12-15 PROCEDURE — 82270 OCCULT BLOOD FECES: CPT

## 2022-12-15 NOTE — HEALTH RISK ASSESSMENT
[0] : 2) Feeling down, depressed, or hopeless: Not at all (0) [PHQ-2 Negative - No further assessment needed] : PHQ-2 Negative - No further assessment needed [[id number]] : 0

## 2022-12-15 NOTE — PHYSICAL EXAM
[Normal Female:] : bladder was normal on palpation [Normal] : normal gait, coordination grossly intact, no focal deficits [de-identified] : 1/6 ejection murmur at the apex which does not increase with Valsalva [de-identified] : Mild cystic changes which are somewhat more marked on the right upper outer quadrant than the left but without dominant mass [FreeTextEntry1] : Back negative no masses [de-identified] : No adenopathy [de-identified] : Thyroid is not palpable [de-identified] : No significant lesion

## 2022-12-15 NOTE — HISTORY OF PRESENT ILLNESS
[de-identified] : The patient is a 72-year-old female who comes in for complete physical examination her history is significant for hypothyroidism and osteopenia and she has otherwise been in good health.  She recently had a urinary tract infection which was treated with her gynecologist and follow-up urine x2 have been negative.  She tends to exercise on and off but has no chest pain palpitations swelling or fainting she has no GI symptoms and had a virtual colonoscopy last year and will get me the records.  She has no weakness numbness loss of balance or incoordination and no gynecologic symptoms.  She is getting her mammograms tomorrow and has been seen by gynecology.  She does not get flu shots but did get her COVID and will get the latest booster

## 2022-12-15 NOTE — ASSESSMENT
[FreeTextEntry1] : The patient is found to be in good health and all blood testing urine and EKG were done which were in the normal range.  She is treated for hypothyroidism and osteoporosis with vitamin D and exercise.  She had a urinary tract infection with by follow-up urine analysis has cleared.  She has a virtual colonoscopy approximately 1 year ago and will get me the records.  She is to get her mammograms and has had pelvic examinations.  She has refused flu shot and Pneumovax but is up-to-date on COVID

## 2023-01-04 ENCOUNTER — APPOINTMENT (OUTPATIENT)
Dept: INTERNAL MEDICINE | Facility: CLINIC | Age: 73
End: 2023-01-04

## 2023-09-08 ENCOUNTER — APPOINTMENT (OUTPATIENT)
Dept: OPHTHALMOLOGY | Facility: CLINIC | Age: 73
End: 2023-09-08
Payer: MEDICARE

## 2023-09-08 ENCOUNTER — NON-APPOINTMENT (OUTPATIENT)
Age: 73
End: 2023-09-08

## 2023-09-08 PROCEDURE — 92014 COMPRE OPH EXAM EST PT 1/>: CPT

## 2024-09-27 ENCOUNTER — APPOINTMENT (OUTPATIENT)
Dept: OPHTHALMOLOGY | Facility: CLINIC | Age: 74
End: 2024-09-27
Payer: MEDICARE

## 2024-09-27 ENCOUNTER — NON-APPOINTMENT (OUTPATIENT)
Age: 74
End: 2024-09-27

## 2024-09-27 PROCEDURE — 92014 COMPRE OPH EXAM EST PT 1/>: CPT

## 2025-01-07 ENCOUNTER — NON-APPOINTMENT (OUTPATIENT)
Age: 75
End: 2025-01-07

## 2025-01-07 ENCOUNTER — APPOINTMENT (OUTPATIENT)
Dept: OPHTHALMOLOGY | Facility: CLINIC | Age: 75
End: 2025-01-07
Payer: MEDICARE

## 2025-01-07 PROCEDURE — 92083 EXTENDED VISUAL FIELD XM: CPT

## 2025-01-07 PROCEDURE — 92133 CPTRZD OPH DX IMG PST SGM ON: CPT

## 2025-01-07 PROCEDURE — 99214 OFFICE O/P EST MOD 30 MIN: CPT

## 2025-06-25 ENCOUNTER — NON-APPOINTMENT (OUTPATIENT)
Age: 75
End: 2025-06-25

## 2025-06-25 ENCOUNTER — APPOINTMENT (OUTPATIENT)
Dept: OPHTHALMOLOGY | Facility: CLINIC | Age: 75
End: 2025-06-25
Payer: MEDICARE

## 2025-06-25 PROCEDURE — 92014 COMPRE OPH EXAM EST PT 1/>: CPT

## 2025-06-25 PROCEDURE — 92133 CPTRZD OPH DX IMG PST SGM ON: CPT

## 2025-07-02 ENCOUNTER — NON-APPOINTMENT (OUTPATIENT)
Age: 75
End: 2025-07-02

## 2025-07-02 ENCOUNTER — APPOINTMENT (OUTPATIENT)
Dept: OPHTHALMOLOGY | Facility: CLINIC | Age: 75
End: 2025-07-02

## 2025-07-02 PROCEDURE — 92012 INTRM OPH EXAM EST PATIENT: CPT

## 2025-07-17 ENCOUNTER — APPOINTMENT (OUTPATIENT)
Dept: OPHTHALMOLOGY | Facility: CLINIC | Age: 75
End: 2025-07-17
Payer: MEDICARE

## 2025-07-17 ENCOUNTER — NON-APPOINTMENT (OUTPATIENT)
Age: 75
End: 2025-07-17

## 2025-07-17 PROCEDURE — 92012 INTRM OPH EXAM EST PATIENT: CPT

## 2025-07-17 PROCEDURE — 92002 INTRM OPH EXAM NEW PATIENT: CPT

## 2025-08-25 ENCOUNTER — APPOINTMENT (OUTPATIENT)
Dept: OPHTHALMOLOGY | Facility: CLINIC | Age: 75
End: 2025-08-25
Payer: MEDICARE

## 2025-08-25 ENCOUNTER — NON-APPOINTMENT (OUTPATIENT)
Age: 75
End: 2025-08-25

## 2025-08-25 PROCEDURE — 92012 INTRM OPH EXAM EST PATIENT: CPT

## 2025-08-25 PROCEDURE — 92060 SENSORIMOTOR EXAMINATION: CPT
